# Patient Record
Sex: FEMALE | Race: OTHER
[De-identification: names, ages, dates, MRNs, and addresses within clinical notes are randomized per-mention and may not be internally consistent; named-entity substitution may affect disease eponyms.]

---

## 2022-12-20 ENCOUNTER — HOSPITAL ENCOUNTER (INPATIENT)
Dept: HOSPITAL 15 - ER | Age: 72
LOS: 5 days | Discharge: HOME | DRG: 282 | End: 2022-12-25
Attending: FAMILY MEDICINE
Payer: MEDICARE

## 2022-12-20 VITALS — WEIGHT: 111.99 LBS | HEIGHT: 59 IN | BODY MASS INDEX: 22.58 KG/M2

## 2022-12-20 DIAGNOSIS — F12.10: ICD-10-CM

## 2022-12-20 DIAGNOSIS — R07.89: ICD-10-CM

## 2022-12-20 DIAGNOSIS — Z90.49: ICD-10-CM

## 2022-12-20 DIAGNOSIS — I10: ICD-10-CM

## 2022-12-20 DIAGNOSIS — Z79.899: ICD-10-CM

## 2022-12-20 DIAGNOSIS — K85.20: Primary | ICD-10-CM

## 2022-12-20 DIAGNOSIS — E78.00: ICD-10-CM

## 2022-12-20 DIAGNOSIS — F10.10: ICD-10-CM

## 2022-12-20 DIAGNOSIS — Z71.41: ICD-10-CM

## 2022-12-20 DIAGNOSIS — Z90.710: ICD-10-CM

## 2022-12-20 DIAGNOSIS — D72.829: ICD-10-CM

## 2022-12-20 DIAGNOSIS — R74.01: ICD-10-CM

## 2022-12-20 DIAGNOSIS — Z80.6: ICD-10-CM

## 2022-12-20 DIAGNOSIS — E78.5: ICD-10-CM

## 2022-12-20 DIAGNOSIS — Z20.822: ICD-10-CM

## 2022-12-20 LAB
ALBUMIN SERPL-MCNC: 3.9 G/DL (ref 3.4–5)
ALCOHOL, URINE: < 3 MG/DL (ref 0–10)
ALP SERPL-CCNC: 157 U/L (ref 45–117)
ALT SERPL-CCNC: 19 U/L (ref 13–56)
AMPHETAMINES UR QL SCN: NEGATIVE
ANION GAP SERPL CALCULATED.3IONS-SCNC: 12 MMOL/L (ref 5–15)
BARBITURATES UR QL SCN: NEGATIVE
BENZODIAZ UR QL SCN: NEGATIVE
BILIRUB SERPL-MCNC: 0.9 MG/DL (ref 0.2–1)
BUN SERPL-MCNC: 15 MG/DL (ref 7–18)
BUN/CREAT SERPL: 16.7
BZE UR QL SCN: NEGATIVE
CALCIUM SERPL-MCNC: 10.1 MG/DL (ref 8.5–10.1)
CANNABINOIDS UR QL SCN: POSITIVE
CHLORIDE SERPL-SCNC: 102 MMOL/L (ref 98–107)
CHOLEST SERPL-MCNC: 201 MG/DL (ref ?–200)
CO2 SERPL-SCNC: 25 MMOL/L (ref 21–32)
GLUCOSE SERPL-MCNC: 121 MG/DL (ref 74–106)
HCT VFR BLD AUTO: 44.6 % (ref 36–46)
HDLC SERPL-MCNC: 88 MG/DL (ref 40–59)
HGB BLD-MCNC: 15.3 G/DL (ref 12.2–16.2)
MCH RBC QN AUTO: 33.6 PG (ref 28–32)
MCV RBC AUTO: 97.8 FL (ref 80–100)
NRBC BLD QL AUTO: 0.3 %
OPIATES UR QL SCN: NEGATIVE
PCP UR QL SCN: NEGATIVE
POTASSIUM SERPL-SCNC: 3.6 MMOL/L (ref 3.5–5.1)
PROT SERPL-MCNC: 7.6 G/DL (ref 6.4–8.2)
SODIUM SERPL-SCNC: 139 MMOL/L (ref 136–145)
TRIGL SERPL-MCNC: 97 MG/DL (ref ?–150)

## 2022-12-20 PROCEDURE — 80320 DRUG SCREEN QUANTALCOHOLS: CPT

## 2022-12-20 PROCEDURE — 80053 COMPREHEN METABOLIC PANEL: CPT

## 2022-12-20 PROCEDURE — 93306 TTE W/DOPPLER COMPLETE: CPT

## 2022-12-20 PROCEDURE — 93017 CV STRESS TEST TRACING ONLY: CPT

## 2022-12-20 PROCEDURE — 74183 MRI ABD W/O CNTR FLWD CNTR: CPT

## 2022-12-20 PROCEDURE — 74176 CT ABD & PELVIS W/O CONTRAST: CPT

## 2022-12-20 PROCEDURE — 86301 IMMUNOASSAY TUMOR CA 19-9: CPT

## 2022-12-20 PROCEDURE — 85025 COMPLETE CBC W/AUTO DIFF WBC: CPT

## 2022-12-20 PROCEDURE — 80307 DRUG TEST PRSMV CHEM ANLYZR: CPT

## 2022-12-20 PROCEDURE — 83605 ASSAY OF LACTIC ACID: CPT

## 2022-12-20 PROCEDURE — 71045 X-RAY EXAM CHEST 1 VIEW: CPT

## 2022-12-20 PROCEDURE — 87426 SARSCOV CORONAVIRUS AG IA: CPT

## 2022-12-20 PROCEDURE — 87040 BLOOD CULTURE FOR BACTERIA: CPT

## 2022-12-20 PROCEDURE — 74181 MRI ABDOMEN W/O CONTRAST: CPT

## 2022-12-20 PROCEDURE — 78452 HT MUSCLE IMAGE SPECT MULT: CPT

## 2022-12-20 PROCEDURE — 36415 COLL VENOUS BLD VENIPUNCTURE: CPT

## 2022-12-20 PROCEDURE — 84443 ASSAY THYROID STIM HORMONE: CPT

## 2022-12-20 PROCEDURE — 84484 ASSAY OF TROPONIN QUANT: CPT

## 2022-12-20 PROCEDURE — 93005 ELECTROCARDIOGRAM TRACING: CPT

## 2022-12-20 PROCEDURE — 80061 LIPID PANEL: CPT

## 2022-12-20 PROCEDURE — 81003 URINALYSIS AUTO W/O SCOPE: CPT

## 2022-12-20 PROCEDURE — 83690 ASSAY OF LIPASE: CPT

## 2022-12-20 RX ADMIN — OXYCODONE HYDROCHLORIDE AND ACETAMINOPHEN SCH MG: 500 TABLET ORAL at 20:47

## 2022-12-20 RX ADMIN — SODIUM CHLORIDE SCH MLS/HR: 0.9 INJECTION, SOLUTION INTRAVENOUS at 21:01

## 2022-12-21 VITALS — DIASTOLIC BLOOD PRESSURE: 107 MMHG | SYSTOLIC BLOOD PRESSURE: 136 MMHG

## 2022-12-21 LAB
ALBUMIN SERPL-MCNC: 3.7 G/DL (ref 3.4–5)
ALP SERPL-CCNC: 136 U/L (ref 45–117)
ALT SERPL-CCNC: 18 U/L (ref 13–56)
ANION GAP SERPL CALCULATED.3IONS-SCNC: 10 MMOL/L (ref 5–15)
BILIRUB SERPL-MCNC: 1 MG/DL (ref 0.2–1)
BUN SERPL-MCNC: 21 MG/DL (ref 7–18)
BUN/CREAT SERPL: 25
CALCIUM SERPL-MCNC: 9.1 MG/DL (ref 8.5–10.1)
CHLORIDE SERPL-SCNC: 105 MMOL/L (ref 98–107)
CO2 SERPL-SCNC: 23 MMOL/L (ref 21–32)
GLUCOSE SERPL-MCNC: 118 MG/DL (ref 74–106)
HCT VFR BLD AUTO: 45.2 % (ref 36–46)
HGB BLD-MCNC: 15 G/DL (ref 12.2–16.2)
MCH RBC QN AUTO: 33 PG (ref 28–32)
MCV RBC AUTO: 99.2 FL (ref 80–100)
NRBC BLD QL AUTO: 0.2 %
POTASSIUM SERPL-SCNC: 3.4 MMOL/L (ref 3.5–5.1)
PROT SERPL-MCNC: 7.1 G/DL (ref 6.4–8.2)
SODIUM SERPL-SCNC: 138 MMOL/L (ref 136–145)

## 2022-12-21 RX ADMIN — MORPHINE SULFATE PRN MG: 2 INJECTION, SOLUTION INTRAMUSCULAR; INTRAVENOUS at 20:53

## 2022-12-21 RX ADMIN — OXYCODONE HYDROCHLORIDE AND ACETAMINOPHEN SCH MG: 500 TABLET ORAL at 11:18

## 2022-12-21 RX ADMIN — ATORVASTATIN CALCIUM SCH MG: 20 TABLET, FILM COATED ORAL at 22:44

## 2022-12-21 RX ADMIN — NITROGLYCERIN PRN MG: 0.4 TABLET SUBLINGUAL at 22:49

## 2022-12-21 RX ADMIN — NITROGLYCERIN PRN MG: 0.4 TABLET SUBLINGUAL at 23:03

## 2022-12-21 RX ADMIN — SODIUM CHLORIDE SCH MG: 9 INJECTION, SOLUTION INTRAVENOUS at 11:17

## 2022-12-21 RX ADMIN — ZINC SULFATE CAP 220 MG (50 MG ELEMENTAL ZN) SCH MG: 220 (50 ZN) CAP at 11:18

## 2022-12-21 RX ADMIN — ACETAMINOPHEN PRN MG: 325 TABLET ORAL at 12:28

## 2022-12-21 RX ADMIN — CEFTRIAXONE SODIUM SCH MLS/HR: 1 INJECTION, POWDER, FOR SOLUTION INTRAMUSCULAR; INTRAVENOUS at 08:55

## 2022-12-21 RX ADMIN — MORPHINE SULFATE PRN MG: 2 INJECTION, SOLUTION INTRAMUSCULAR; INTRAVENOUS at 23:19

## 2022-12-21 RX ADMIN — OXYCODONE HYDROCHLORIDE AND ACETAMINOPHEN SCH MG: 500 TABLET ORAL at 22:44

## 2022-12-21 RX ADMIN — MULTIVITAMIN TABLET SCH TAB: TABLET at 11:18

## 2022-12-21 RX ADMIN — SODIUM CHLORIDE SCH MLS/HR: 0.9 INJECTION, SOLUTION INTRAVENOUS at 12:06

## 2022-12-21 RX ADMIN — ENOXAPARIN SODIUM SCH MG: 30 INJECTION SUBCUTANEOUS at 11:18

## 2022-12-21 RX ADMIN — NITROGLYCERIN PRN MG: 0.4 TABLET SUBLINGUAL at 22:56

## 2022-12-21 RX ADMIN — ACETAMINOPHEN PRN MG: 325 TABLET ORAL at 06:19

## 2022-12-22 VITALS — DIASTOLIC BLOOD PRESSURE: 86 MMHG | SYSTOLIC BLOOD PRESSURE: 140 MMHG

## 2022-12-22 VITALS — DIASTOLIC BLOOD PRESSURE: 94 MMHG | SYSTOLIC BLOOD PRESSURE: 144 MMHG

## 2022-12-22 VITALS — SYSTOLIC BLOOD PRESSURE: 131 MMHG | DIASTOLIC BLOOD PRESSURE: 88 MMHG

## 2022-12-22 VITALS — DIASTOLIC BLOOD PRESSURE: 94 MMHG | SYSTOLIC BLOOD PRESSURE: 149 MMHG

## 2022-12-22 VITALS — DIASTOLIC BLOOD PRESSURE: 79 MMHG | SYSTOLIC BLOOD PRESSURE: 122 MMHG

## 2022-12-22 LAB
ALBUMIN SERPL-MCNC: 2.8 G/DL (ref 3.4–5)
ALP SERPL-CCNC: 105 U/L (ref 45–117)
ALT SERPL-CCNC: 17 U/L (ref 13–56)
ANION GAP SERPL CALCULATED.3IONS-SCNC: 6 MMOL/L (ref 5–15)
BILIRUB SERPL-MCNC: 0.8 MG/DL (ref 0.2–1)
BUN SERPL-MCNC: 15 MG/DL (ref 7–18)
BUN/CREAT SERPL: 21.1
CALCIUM SERPL-MCNC: 8.1 MG/DL (ref 8.5–10.1)
CHLORIDE SERPL-SCNC: 110 MMOL/L (ref 98–107)
CO2 SERPL-SCNC: 23 MMOL/L (ref 21–32)
GLUCOSE SERPL-MCNC: 76 MG/DL (ref 74–106)
LIPASE SERPL-CCNC: 418 U/L (ref 73–393)
POTASSIUM SERPL-SCNC: 3.3 MMOL/L (ref 3.5–5.1)
PROT SERPL-MCNC: 5.9 G/DL (ref 6.4–8.2)
SODIUM SERPL-SCNC: 139 MMOL/L (ref 136–145)

## 2022-12-22 RX ADMIN — MORPHINE SULFATE PRN MG: 2 INJECTION, SOLUTION INTRAMUSCULAR; INTRAVENOUS at 22:15

## 2022-12-22 RX ADMIN — CEFTRIAXONE SODIUM SCH MLS/HR: 1 INJECTION, POWDER, FOR SOLUTION INTRAMUSCULAR; INTRAVENOUS at 10:05

## 2022-12-22 RX ADMIN — ENOXAPARIN SODIUM SCH MG: 30 INJECTION SUBCUTANEOUS at 10:06

## 2022-12-22 RX ADMIN — OXYCODONE HYDROCHLORIDE AND ACETAMINOPHEN SCH MG: 500 TABLET ORAL at 22:22

## 2022-12-22 RX ADMIN — MORPHINE SULFATE PRN MG: 2 INJECTION, SOLUTION INTRAMUSCULAR; INTRAVENOUS at 13:31

## 2022-12-22 RX ADMIN — OXYCODONE HYDROCHLORIDE AND ACETAMINOPHEN SCH MG: 500 TABLET ORAL at 10:08

## 2022-12-22 RX ADMIN — ZINC SULFATE CAP 220 MG (50 MG ELEMENTAL ZN) SCH MG: 220 (50 ZN) CAP at 10:08

## 2022-12-22 RX ADMIN — MULTIVITAMIN TABLET SCH TAB: TABLET at 10:07

## 2022-12-22 RX ADMIN — ATORVASTATIN CALCIUM SCH MG: 20 TABLET, FILM COATED ORAL at 22:22

## 2022-12-22 RX ADMIN — SODIUM CHLORIDE SCH MG: 9 INJECTION, SOLUTION INTRAVENOUS at 10:10

## 2022-12-23 VITALS — SYSTOLIC BLOOD PRESSURE: 131 MMHG | DIASTOLIC BLOOD PRESSURE: 92 MMHG

## 2022-12-23 VITALS — DIASTOLIC BLOOD PRESSURE: 94 MMHG | SYSTOLIC BLOOD PRESSURE: 140 MMHG

## 2022-12-23 VITALS — SYSTOLIC BLOOD PRESSURE: 131 MMHG | DIASTOLIC BLOOD PRESSURE: 102 MMHG

## 2022-12-23 VITALS — DIASTOLIC BLOOD PRESSURE: 87 MMHG | SYSTOLIC BLOOD PRESSURE: 127 MMHG

## 2022-12-23 VITALS — SYSTOLIC BLOOD PRESSURE: 144 MMHG | DIASTOLIC BLOOD PRESSURE: 87 MMHG

## 2022-12-23 RX ADMIN — ENOXAPARIN SODIUM SCH MG: 30 INJECTION SUBCUTANEOUS at 09:23

## 2022-12-23 RX ADMIN — MULTIVITAMIN TABLET SCH TAB: TABLET at 09:22

## 2022-12-23 RX ADMIN — OXYCODONE HYDROCHLORIDE AND ACETAMINOPHEN SCH MG: 500 TABLET ORAL at 09:22

## 2022-12-23 RX ADMIN — MORPHINE SULFATE PRN MG: 2 INJECTION, SOLUTION INTRAMUSCULAR; INTRAVENOUS at 09:33

## 2022-12-23 RX ADMIN — ATORVASTATIN CALCIUM SCH MG: 20 TABLET, FILM COATED ORAL at 21:55

## 2022-12-23 RX ADMIN — CEFTRIAXONE SODIUM SCH MLS/HR: 1 INJECTION, POWDER, FOR SOLUTION INTRAMUSCULAR; INTRAVENOUS at 09:16

## 2022-12-23 RX ADMIN — MORPHINE SULFATE PRN MG: 2 INJECTION, SOLUTION INTRAMUSCULAR; INTRAVENOUS at 15:58

## 2022-12-23 RX ADMIN — MORPHINE SULFATE PRN MG: 2 INJECTION, SOLUTION INTRAMUSCULAR; INTRAVENOUS at 21:56

## 2022-12-23 RX ADMIN — ZINC SULFATE CAP 220 MG (50 MG ELEMENTAL ZN) SCH MG: 220 (50 ZN) CAP at 09:21

## 2022-12-23 RX ADMIN — ACETAMINOPHEN PRN MG: 325 TABLET ORAL at 03:00

## 2022-12-23 RX ADMIN — OXYCODONE HYDROCHLORIDE AND ACETAMINOPHEN SCH MG: 500 TABLET ORAL at 21:55

## 2022-12-23 RX ADMIN — SODIUM CHLORIDE SCH MG: 9 INJECTION, SOLUTION INTRAVENOUS at 09:18

## 2022-12-23 RX ADMIN — MORPHINE SULFATE PRN MG: 2 INJECTION, SOLUTION INTRAMUSCULAR; INTRAVENOUS at 03:53

## 2022-12-24 VITALS — DIASTOLIC BLOOD PRESSURE: 89 MMHG | SYSTOLIC BLOOD PRESSURE: 126 MMHG

## 2022-12-24 VITALS — DIASTOLIC BLOOD PRESSURE: 78 MMHG | SYSTOLIC BLOOD PRESSURE: 117 MMHG

## 2022-12-24 VITALS — DIASTOLIC BLOOD PRESSURE: 98 MMHG | SYSTOLIC BLOOD PRESSURE: 139 MMHG

## 2022-12-24 VITALS — DIASTOLIC BLOOD PRESSURE: 92 MMHG | SYSTOLIC BLOOD PRESSURE: 134 MMHG

## 2022-12-24 VITALS — DIASTOLIC BLOOD PRESSURE: 96 MMHG | SYSTOLIC BLOOD PRESSURE: 141 MMHG

## 2022-12-24 RX ADMIN — ZINC SULFATE CAP 220 MG (50 MG ELEMENTAL ZN) SCH MG: 220 (50 ZN) CAP at 09:27

## 2022-12-24 RX ADMIN — ACETAMINOPHEN PRN MG: 325 TABLET ORAL at 04:53

## 2022-12-24 RX ADMIN — OXYCODONE HYDROCHLORIDE AND ACETAMINOPHEN SCH MG: 500 TABLET ORAL at 09:26

## 2022-12-24 RX ADMIN — SODIUM CHLORIDE SCH MG: 9 INJECTION, SOLUTION INTRAVENOUS at 09:27

## 2022-12-24 RX ADMIN — ENOXAPARIN SODIUM SCH MG: 30 INJECTION SUBCUTANEOUS at 09:28

## 2022-12-24 RX ADMIN — ATORVASTATIN CALCIUM SCH MG: 20 TABLET, FILM COATED ORAL at 20:55

## 2022-12-24 RX ADMIN — MORPHINE SULFATE PRN MG: 2 INJECTION, SOLUTION INTRAMUSCULAR; INTRAVENOUS at 08:28

## 2022-12-24 RX ADMIN — MORPHINE SULFATE PRN MG: 2 INJECTION, SOLUTION INTRAMUSCULAR; INTRAVENOUS at 13:24

## 2022-12-24 RX ADMIN — OXYCODONE HYDROCHLORIDE AND ACETAMINOPHEN SCH MG: 500 TABLET ORAL at 20:55

## 2022-12-24 RX ADMIN — MORPHINE SULFATE PRN MG: 2 INJECTION, SOLUTION INTRAMUSCULAR; INTRAVENOUS at 09:16

## 2022-12-24 RX ADMIN — MULTIVITAMIN TABLET SCH TAB: TABLET at 09:26

## 2022-12-24 RX ADMIN — CEFTRIAXONE SODIUM SCH MLS/HR: 1 INJECTION, POWDER, FOR SOLUTION INTRAMUSCULAR; INTRAVENOUS at 09:28

## 2022-12-24 RX ADMIN — MORPHINE SULFATE PRN MG: 2 INJECTION, SOLUTION INTRAMUSCULAR; INTRAVENOUS at 18:39

## 2022-12-24 RX ADMIN — MORPHINE SULFATE PRN MG: 2 INJECTION, SOLUTION INTRAMUSCULAR; INTRAVENOUS at 22:28

## 2022-12-25 VITALS — DIASTOLIC BLOOD PRESSURE: 107 MMHG | SYSTOLIC BLOOD PRESSURE: 145 MMHG

## 2022-12-25 VITALS — DIASTOLIC BLOOD PRESSURE: 63 MMHG | SYSTOLIC BLOOD PRESSURE: 128 MMHG

## 2022-12-25 VITALS — SYSTOLIC BLOOD PRESSURE: 134 MMHG | DIASTOLIC BLOOD PRESSURE: 81 MMHG

## 2022-12-25 VITALS — SYSTOLIC BLOOD PRESSURE: 134 MMHG | DIASTOLIC BLOOD PRESSURE: 93 MMHG

## 2022-12-25 RX ADMIN — CEFTRIAXONE SODIUM SCH MLS/HR: 1 INJECTION, POWDER, FOR SOLUTION INTRAMUSCULAR; INTRAVENOUS at 08:39

## 2022-12-25 RX ADMIN — ENOXAPARIN SODIUM SCH MG: 30 INJECTION SUBCUTANEOUS at 08:39

## 2022-12-25 RX ADMIN — MORPHINE SULFATE PRN MG: 2 INJECTION, SOLUTION INTRAMUSCULAR; INTRAVENOUS at 05:29

## 2022-12-25 RX ADMIN — ZINC SULFATE CAP 220 MG (50 MG ELEMENTAL ZN) SCH MG: 220 (50 ZN) CAP at 08:37

## 2022-12-25 RX ADMIN — OXYCODONE HYDROCHLORIDE AND ACETAMINOPHEN SCH MG: 500 TABLET ORAL at 08:39

## 2022-12-25 RX ADMIN — SODIUM CHLORIDE SCH MG: 9 INJECTION, SOLUTION INTRAVENOUS at 08:38

## 2022-12-25 RX ADMIN — MULTIVITAMIN TABLET SCH TAB: TABLET at 08:43

## 2022-12-25 RX ADMIN — MORPHINE SULFATE PRN MG: 2 INJECTION, SOLUTION INTRAMUSCULAR; INTRAVENOUS at 09:53

## 2023-06-16 LAB
ALBUMIN SERPL-MCNC: 3.2 G/DL (ref 3.4–5)
ALP SERPL-CCNC: 114 U/L (ref 45–117)
ALT SERPL-CCNC: 17 U/L (ref 13–56)
ANION GAP SERPL CALCULATED.3IONS-SCNC: 5 MMOL/L (ref 5–15)
APTT PPP: 26.7 SEC (ref 24.6–33.4)
BILIRUB SERPL-MCNC: 0.3 MG/DL (ref 0.2–1)
BUN SERPL-MCNC: 15 MG/DL (ref 7–18)
BUN/CREAT SERPL: 16 (ref 10–20)
CALCIUM SERPL-MCNC: 8.3 MG/DL (ref 8.5–10.1)
CHLORIDE SERPL-SCNC: 109 MMOL/L (ref 98–107)
CO2 SERPL-SCNC: 26 MMOL/L (ref 21–32)
GLUCOSE SERPL-MCNC: 87 MG/DL (ref 74–106)
HCT VFR BLD AUTO: 43 % (ref 36–46)
HGB BLD-MCNC: 14.4 G/DL (ref 12.2–16.2)
INR PPP: 0.91 (ref 0.9–1.15)
MCH RBC QN AUTO: 34.1 PG (ref 28–32)
MCV RBC AUTO: 102 FL (ref 80–100)
NRBC BLD QL AUTO: 0.1 %
POTASSIUM SERPL-SCNC: 4.1 MMOL/L (ref 3.5–5.1)
PROT SERPL-MCNC: 6.9 G/DL (ref 6.4–8.2)
SODIUM SERPL-SCNC: 140 MMOL/L (ref 136–145)

## 2023-06-23 ENCOUNTER — HOSPITAL ENCOUNTER (OUTPATIENT)
Dept: HOSPITAL 15 - GI | Age: 73
Discharge: HOME | End: 2023-06-23
Attending: INTERNAL MEDICINE
Payer: MEDICARE

## 2023-06-23 VITALS — BODY MASS INDEX: 19.56 KG/M2 | HEIGHT: 59 IN | WEIGHT: 97 LBS

## 2023-06-23 VITALS — SYSTOLIC BLOOD PRESSURE: 134 MMHG | DIASTOLIC BLOOD PRESSURE: 89 MMHG

## 2023-06-23 DIAGNOSIS — Z12.11: Primary | ICD-10-CM

## 2023-06-23 DIAGNOSIS — Z98.890: ICD-10-CM

## 2023-06-23 DIAGNOSIS — D12.3: ICD-10-CM

## 2023-06-23 DIAGNOSIS — D12.2: ICD-10-CM

## 2023-06-23 DIAGNOSIS — Z83.79: ICD-10-CM

## 2023-06-23 DIAGNOSIS — Z79.899: ICD-10-CM

## 2023-06-23 DIAGNOSIS — D12.4: ICD-10-CM

## 2023-06-23 DIAGNOSIS — Z80.6: ICD-10-CM

## 2023-06-23 DIAGNOSIS — D12.0: ICD-10-CM

## 2023-06-23 DIAGNOSIS — I10: ICD-10-CM

## 2023-06-23 DIAGNOSIS — Z90.710: ICD-10-CM

## 2023-06-23 PROCEDURE — 36415 COLL VENOUS BLD VENIPUNCTURE: CPT

## 2023-06-23 PROCEDURE — 80053 COMPREHEN METABOLIC PANEL: CPT

## 2023-06-23 PROCEDURE — 99152 MOD SED SAME PHYS/QHP 5/>YRS: CPT

## 2023-06-23 PROCEDURE — 85025 COMPLETE CBC W/AUTO DIFF WBC: CPT

## 2023-06-23 PROCEDURE — 99153 MOD SED SAME PHYS/QHP EA: CPT

## 2023-06-23 PROCEDURE — 85730 THROMBOPLASTIN TIME PARTIAL: CPT

## 2023-06-23 PROCEDURE — 88305 TISSUE EXAM BY PATHOLOGIST: CPT

## 2023-06-23 PROCEDURE — 85610 PROTHROMBIN TIME: CPT

## 2023-06-23 PROCEDURE — 45385 COLONOSCOPY W/LESION REMOVAL: CPT

## 2023-06-23 RX ADMIN — MIDAZOLAM HYDROCHLORIDE ONE MG: 1 INJECTION, SOLUTION INTRAMUSCULAR; INTRAVENOUS at 14:39

## 2023-06-23 RX ADMIN — FENTANYL CITRATE ONE MCG: 50 INJECTION, SOLUTION INTRAMUSCULAR; INTRAVENOUS at 14:29

## 2023-06-23 RX ADMIN — FENTANYL CITRATE ONE MCG: 50 INJECTION, SOLUTION INTRAMUSCULAR; INTRAVENOUS at 14:39

## 2023-06-23 RX ADMIN — FENTANYL CITRATE ONE MCG: 50 INJECTION, SOLUTION INTRAMUSCULAR; INTRAVENOUS at 14:36

## 2023-06-23 RX ADMIN — FENTANYL CITRATE ONE MCG: 50 INJECTION, SOLUTION INTRAMUSCULAR; INTRAVENOUS at 14:33

## 2023-06-23 RX ADMIN — MIDAZOLAM HYDROCHLORIDE ONE MG: 1 INJECTION, SOLUTION INTRAMUSCULAR; INTRAVENOUS at 14:36

## 2023-06-23 RX ADMIN — MIDAZOLAM HYDROCHLORIDE ONE MG: 1 INJECTION, SOLUTION INTRAMUSCULAR; INTRAVENOUS at 14:33

## 2023-06-23 RX ADMIN — MIDAZOLAM HYDROCHLORIDE ONE MG: 1 INJECTION, SOLUTION INTRAMUSCULAR; INTRAVENOUS at 14:29

## 2023-06-23 RX ADMIN — MIDAZOLAM HYDROCHLORIDE ONE MG: 1 INJECTION, SOLUTION INTRAMUSCULAR; INTRAVENOUS at 14:59

## 2023-06-23 RX ADMIN — MIDAZOLAM HYDROCHLORIDE ONE MG: 1 INJECTION, SOLUTION INTRAMUSCULAR; INTRAVENOUS at 14:49

## 2024-08-06 ENCOUNTER — HOSPITAL ENCOUNTER (INPATIENT)
Dept: HOSPITAL 15 - ER | Age: 74
LOS: 3 days | Discharge: HOME | DRG: 720 | End: 2024-08-09
Attending: INTERNAL MEDICINE | Admitting: INTERNAL MEDICINE
Payer: MEDICARE

## 2024-08-06 VITALS — OXYGEN SATURATION: 99 % | RESPIRATION RATE: 14 BRPM | HEART RATE: 81 BPM

## 2024-08-06 VITALS — BODY MASS INDEX: 19.07 KG/M2 | HEIGHT: 59 IN | WEIGHT: 94.58 LBS

## 2024-08-06 VITALS
DIASTOLIC BLOOD PRESSURE: 67 MMHG | OXYGEN SATURATION: 98 % | TEMPERATURE: 97.6 F | HEART RATE: 88 BPM | RESPIRATION RATE: 14 BRPM | SYSTOLIC BLOOD PRESSURE: 96 MMHG

## 2024-08-06 DIAGNOSIS — Z90.49: ICD-10-CM

## 2024-08-06 DIAGNOSIS — N39.0: ICD-10-CM

## 2024-08-06 DIAGNOSIS — K86.89: ICD-10-CM

## 2024-08-06 DIAGNOSIS — E87.6: ICD-10-CM

## 2024-08-06 DIAGNOSIS — E87.20: ICD-10-CM

## 2024-08-06 DIAGNOSIS — A41.9: Primary | ICD-10-CM

## 2024-08-06 DIAGNOSIS — K57.30: ICD-10-CM

## 2024-08-06 DIAGNOSIS — K52.9: ICD-10-CM

## 2024-08-06 DIAGNOSIS — Z90.710: ICD-10-CM

## 2024-08-06 DIAGNOSIS — E83.51: ICD-10-CM

## 2024-08-06 DIAGNOSIS — G90.9: ICD-10-CM

## 2024-08-06 DIAGNOSIS — I10: ICD-10-CM

## 2024-08-06 DIAGNOSIS — D64.9: ICD-10-CM

## 2024-08-06 LAB
ALBUMIN SERPL-MCNC: 3.9 G/DL (ref 3.2–4.8)
ALP SERPL-CCNC: 112 U/L (ref 46–116)
ALT SERPL-CCNC: 12 U/L (ref 7–40)
ANION GAP SERPL CALCULATED.3IONS-SCNC: 7 MMOL/L (ref 5–15)
BILIRUB SERPL-MCNC: 0.6 MG/DL (ref 0.2–1)
BUN SERPL-MCNC: 23 MG/DL (ref 9–23)
BUN/CREAT SERPL: 16.8 (ref 10–20)
CALCIUM SERPL-MCNC: 9 MG/DL (ref 8.7–10.4)
CHLORIDE SERPL-SCNC: 112 MMOL/L (ref 98–107)
CO2 SERPL-SCNC: 15 MMOL/L (ref 20–30)
CRYSTALS UR QL AUTO: (no result) /HPF
GLUCOSE SERPL-MCNC: 127 MG/DL (ref 74–106)
HCT VFR BLD AUTO: 39.1 % (ref 36–46)
HGB BLD-MCNC: 13.4 G/DL (ref 12.2–16.2)
LIPASE SERPL-CCNC: 40 U/L (ref 12–53)
MCH RBC QN AUTO: 36.7 PG (ref 28–32)
MCV RBC AUTO: 107 FL (ref 80–100)
NRBC BLD QL AUTO: 0 %
POTASSIUM SERPL-SCNC: 3.8 MMOL/L (ref 3.5–5.1)
PROT SERPL-MCNC: 6.7 G/DL (ref 5.7–8.2)
PROT UR STRIP.AUTO-MCNC: (no result) MG/DL
SODIUM SERPL-SCNC: 134 MMOL/L (ref 136–145)

## 2024-08-06 PROCEDURE — 85025 COMPLETE CBC W/AUTO DIFF WBC: CPT

## 2024-08-06 PROCEDURE — 83735 ASSAY OF MAGNESIUM: CPT

## 2024-08-06 PROCEDURE — 74177 CT ABD & PELVIS W/CONTRAST: CPT

## 2024-08-06 PROCEDURE — 80053 COMPREHEN METABOLIC PANEL: CPT

## 2024-08-06 PROCEDURE — 87040 BLOOD CULTURE FOR BACTERIA: CPT

## 2024-08-06 PROCEDURE — 81001 URINALYSIS AUTO W/SCOPE: CPT

## 2024-08-06 PROCEDURE — 93005 ELECTROCARDIOGRAM TRACING: CPT

## 2024-08-06 PROCEDURE — 70450 CT HEAD/BRAIN W/O DYE: CPT

## 2024-08-06 PROCEDURE — 82805 BLOOD GASES W/O2 SATURATION: CPT

## 2024-08-06 PROCEDURE — 82607 VITAMIN B-12: CPT

## 2024-08-06 PROCEDURE — 82962 GLUCOSE BLOOD TEST: CPT

## 2024-08-06 PROCEDURE — 87086 URINE CULTURE/COLONY COUNT: CPT

## 2024-08-06 PROCEDURE — 83690 ASSAY OF LIPASE: CPT

## 2024-08-06 PROCEDURE — 36415 COLL VENOUS BLD VENIPUNCTURE: CPT

## 2024-08-06 PROCEDURE — 96361 HYDRATE IV INFUSION ADD-ON: CPT

## 2024-08-06 PROCEDURE — 82746 ASSAY OF FOLIC ACID SERUM: CPT

## 2024-08-06 PROCEDURE — 96365 THER/PROPH/DIAG IV INF INIT: CPT

## 2024-08-06 PROCEDURE — 83605 ASSAY OF LACTIC ACID: CPT

## 2024-08-06 PROCEDURE — 87493 C DIFF AMPLIFIED PROBE: CPT

## 2024-08-06 PROCEDURE — 71260 CT THORAX DX C+: CPT

## 2024-08-06 PROCEDURE — 74176 CT ABD & PELVIS W/O CONTRAST: CPT

## 2024-08-06 PROCEDURE — 36600 WITHDRAWAL OF ARTERIAL BLOOD: CPT

## 2024-08-06 PROCEDURE — 84132 ASSAY OF SERUM POTASSIUM: CPT

## 2024-08-06 RX ADMIN — CEFTRIAXONE SODIUM ONE MLS/HR: 1 INJECTION, POWDER, FOR SOLUTION INTRAMUSCULAR; INTRAVENOUS at 19:02

## 2024-08-06 RX ADMIN — SODIUM CHLORIDE ONE MLS/HR: 0.9 INJECTION, SOLUTION INTRAVENOUS at 13:22

## 2024-08-07 VITALS
TEMPERATURE: 97.5 F | RESPIRATION RATE: 18 BRPM | OXYGEN SATURATION: 98 % | HEART RATE: 81 BPM | SYSTOLIC BLOOD PRESSURE: 101 MMHG | DIASTOLIC BLOOD PRESSURE: 70 MMHG

## 2024-08-07 VITALS
RESPIRATION RATE: 17 BRPM | HEART RATE: 87 BPM | SYSTOLIC BLOOD PRESSURE: 83 MMHG | OXYGEN SATURATION: 98 % | TEMPERATURE: 98.2 F | DIASTOLIC BLOOD PRESSURE: 60 MMHG

## 2024-08-07 VITALS
TEMPERATURE: 98.7 F | DIASTOLIC BLOOD PRESSURE: 57 MMHG | OXYGEN SATURATION: 99 % | SYSTOLIC BLOOD PRESSURE: 83 MMHG | RESPIRATION RATE: 18 BRPM | HEART RATE: 80 BPM

## 2024-08-07 VITALS
HEART RATE: 87 BPM | SYSTOLIC BLOOD PRESSURE: 105 MMHG | TEMPERATURE: 97.6 F | RESPIRATION RATE: 16 BRPM | DIASTOLIC BLOOD PRESSURE: 71 MMHG | OXYGEN SATURATION: 96 %

## 2024-08-07 VITALS — HEART RATE: 92 BPM

## 2024-08-07 VITALS
TEMPERATURE: 97.6 F | HEART RATE: 71 BPM | SYSTOLIC BLOOD PRESSURE: 86 MMHG | RESPIRATION RATE: 16 BRPM | OXYGEN SATURATION: 96 % | DIASTOLIC BLOOD PRESSURE: 68 MMHG

## 2024-08-07 VITALS — RESPIRATION RATE: 16 BRPM | OXYGEN SATURATION: 96 % | HEART RATE: 71 BPM

## 2024-08-07 VITALS — RESPIRATION RATE: 16 BRPM | OXYGEN SATURATION: 95 % | HEART RATE: 70 BPM

## 2024-08-07 LAB
ALBUMIN SERPL-MCNC: 3 G/DL (ref 3.2–4.8)
ALP SERPL-CCNC: 86 U/L (ref 46–116)
ALT SERPL-CCNC: < 9 U/L (ref 7–40)
ANION GAP SERPL CALCULATED.3IONS-SCNC: 10 MMOL/L (ref 5–15)
BASE EXCESS BLDV CALC-SCNC: -11.8 MMOL/L (ref -2–2)
BILIRUB SERPL-MCNC: 0.4 MG/DL (ref 0.2–1)
BUN SERPL-MCNC: 18 MG/DL (ref 9–23)
BUN/CREAT SERPL: 22.8 (ref 10–20)
CALCIUM SERPL-MCNC: 7.4 MG/DL (ref 8.7–10.4)
CHLORIDE SERPL-SCNC: 116 MMOL/L (ref 98–107)
CO2 SERPL-SCNC: 12 MMOL/L (ref 20–30)
GLUCOSE SERPL-MCNC: 74 MG/DL (ref 74–106)
HCT VFR BLD AUTO: 33.5 % (ref 36–46)
HGB BLD-MCNC: 11.6 G/DL (ref 12.2–16.2)
LACTATE PLASV-SCNC: 2 MMOL/L (ref 0.4–2)
MCH RBC QN AUTO: 36.9 PG (ref 28–32)
MCV RBC AUTO: 106.9 FL (ref 80–100)
NRBC BLD QL AUTO: 0.1 %
POTASSIUM SERPL-SCNC: 2.9 MMOL/L (ref 3.5–5.1)
PROT SERPL-MCNC: 4.8 G/DL (ref 5.7–8.2)
SODIUM SERPL-SCNC: 138 MMOL/L (ref 136–145)

## 2024-08-07 RX ADMIN — ENOXAPARIN SODIUM SCH MG: 30 INJECTION SUBCUTANEOUS at 09:33

## 2024-08-07 RX ADMIN — POTASSIUM CHLORIDE ONE MLS/HR: 200 INJECTION, SOLUTION INTRAVENOUS at 11:45

## 2024-08-07 RX ADMIN — MAGNESIUM SULFATE IN DEXTROSE SCH MLS/HR: 10 INJECTION, SOLUTION INTRAVENOUS at 18:00

## 2024-08-07 RX ADMIN — SODIUM CHLORIDE SCH MLS/HR: 0.9 INJECTION, SOLUTION INTRAVENOUS at 14:45

## 2024-08-07 RX ADMIN — POTASSIUM CHLORIDE ONE MEQ: 1500 TABLET, EXTENDED RELEASE ORAL at 23:10

## 2024-08-07 RX ADMIN — CEFTRIAXONE SODIUM SCH MLS/HR: 1 INJECTION, POWDER, FOR SOLUTION INTRAMUSCULAR; INTRAVENOUS at 09:33

## 2024-08-07 RX ADMIN — CYANOCOBALAMIN ONE MCG: 1000 INJECTION, SOLUTION INTRAMUSCULAR at 23:09

## 2024-08-07 RX ADMIN — PANTOPRAZOLE SODIUM SCH MG: 40 INJECTION, POWDER, FOR SOLUTION INTRAVENOUS at 09:33

## 2024-08-07 RX ADMIN — MECLIZINE HYDROCHLORIDE PRN MG: 25 TABLET ORAL at 07:03

## 2024-08-07 RX ADMIN — SODIUM CHLORIDE ONE MLS/HR: 0.9 INJECTION, SOLUTION INTRAVENOUS at 12:06

## 2024-08-07 RX ADMIN — HUMAN INSULIN SCH MLS/HR: 100 INJECTION, SOLUTION SUBCUTANEOUS at 08:30

## 2024-08-08 VITALS
HEART RATE: 78 BPM | DIASTOLIC BLOOD PRESSURE: 55 MMHG | TEMPERATURE: 97.8 F | SYSTOLIC BLOOD PRESSURE: 82 MMHG | OXYGEN SATURATION: 93 % | RESPIRATION RATE: 15 BRPM

## 2024-08-08 VITALS — OXYGEN SATURATION: 98 % | HEART RATE: 76 BPM | RESPIRATION RATE: 17 BRPM

## 2024-08-08 VITALS
SYSTOLIC BLOOD PRESSURE: 105 MMHG | HEART RATE: 72 BPM | DIASTOLIC BLOOD PRESSURE: 77 MMHG | TEMPERATURE: 97.8 F | RESPIRATION RATE: 14 BRPM | OXYGEN SATURATION: 98 %

## 2024-08-08 VITALS
HEART RATE: 71 BPM | SYSTOLIC BLOOD PRESSURE: 101 MMHG | TEMPERATURE: 97.5 F | RESPIRATION RATE: 20 BRPM | OXYGEN SATURATION: 100 % | DIASTOLIC BLOOD PRESSURE: 66 MMHG

## 2024-08-08 VITALS
HEART RATE: 77 BPM | TEMPERATURE: 97.5 F | SYSTOLIC BLOOD PRESSURE: 111 MMHG | OXYGEN SATURATION: 100 % | RESPIRATION RATE: 18 BRPM | DIASTOLIC BLOOD PRESSURE: 70 MMHG

## 2024-08-08 VITALS — HEART RATE: 70 BPM

## 2024-08-08 VITALS
DIASTOLIC BLOOD PRESSURE: 60 MMHG | OXYGEN SATURATION: 95 % | HEART RATE: 73 BPM | TEMPERATURE: 98 F | RESPIRATION RATE: 17 BRPM | SYSTOLIC BLOOD PRESSURE: 91 MMHG

## 2024-08-08 VITALS
OXYGEN SATURATION: 97 % | DIASTOLIC BLOOD PRESSURE: 73 MMHG | TEMPERATURE: 98.2 F | SYSTOLIC BLOOD PRESSURE: 96 MMHG | RESPIRATION RATE: 18 BRPM | HEART RATE: 75 BPM

## 2024-08-08 VITALS — OXYGEN SATURATION: 96 % | HEART RATE: 70 BPM | RESPIRATION RATE: 16 BRPM

## 2024-08-08 VITALS — DIASTOLIC BLOOD PRESSURE: 65 MMHG | SYSTOLIC BLOOD PRESSURE: 100 MMHG

## 2024-08-08 VITALS
SYSTOLIC BLOOD PRESSURE: 97 MMHG | RESPIRATION RATE: 18 BRPM | TEMPERATURE: 98.3 F | HEART RATE: 78 BPM | DIASTOLIC BLOOD PRESSURE: 53 MMHG | OXYGEN SATURATION: 98 %

## 2024-08-08 LAB
ALBUMIN SERPL-MCNC: 3.2 G/DL (ref 3.2–4.8)
ALP SERPL-CCNC: 88 U/L (ref 46–116)
ALT SERPL-CCNC: 10 U/L (ref 7–40)
ANION GAP SERPL CALCULATED.3IONS-SCNC: 4 MMOL/L (ref 5–15)
BILIRUB SERPL-MCNC: 0.3 MG/DL (ref 0.2–1)
BUN SERPL-MCNC: 11 MG/DL (ref 9–23)
BUN/CREAT SERPL: 12.6 (ref 10–20)
CALCIUM SERPL-MCNC: 8.3 MG/DL (ref 8.7–10.4)
CHLORIDE SERPL-SCNC: 118 MMOL/L (ref 98–107)
CO2 SERPL-SCNC: 14 MMOL/L (ref 20–30)
GLUCOSE SERPL-MCNC: 69 MG/DL (ref 74–106)
HCT VFR BLD AUTO: 33.8 % (ref 36–46)
HGB BLD-MCNC: 11.6 G/DL (ref 12.2–16.2)
MAGNESIUM SERPL-MCNC: 2.3 MG/DL (ref 1.6–2.6)
MCH RBC QN AUTO: 37.4 PG (ref 28–32)
MCV RBC AUTO: 109.1 FL (ref 80–100)
NRBC BLD QL AUTO: 0 %
POTASSIUM SERPL-SCNC: 4.6 MMOL/L (ref 3.5–5.1)
PROT SERPL-MCNC: 5.3 G/DL (ref 5.7–8.2)
SODIUM SERPL-SCNC: 136 MMOL/L (ref 136–145)

## 2024-08-08 RX ADMIN — CYANOCOBALAMIN TAB 500 MCG SCH MCG: 500 TAB at 09:53

## 2024-08-08 RX ADMIN — SODIUM CHLORIDE ONE MLS/HR: 0.9 INJECTION, SOLUTION INTRAVENOUS at 04:56

## 2024-08-09 VITALS
HEART RATE: 72 BPM | RESPIRATION RATE: 17 BRPM | DIASTOLIC BLOOD PRESSURE: 71 MMHG | SYSTOLIC BLOOD PRESSURE: 113 MMHG | TEMPERATURE: 98.1 F

## 2024-08-09 VITALS
TEMPERATURE: 98.1 F | SYSTOLIC BLOOD PRESSURE: 92 MMHG | HEART RATE: 81 BPM | OXYGEN SATURATION: 98 % | DIASTOLIC BLOOD PRESSURE: 68 MMHG

## 2024-08-09 VITALS
DIASTOLIC BLOOD PRESSURE: 74 MMHG | TEMPERATURE: 98.1 F | HEART RATE: 77 BPM | RESPIRATION RATE: 18 BRPM | OXYGEN SATURATION: 93 % | SYSTOLIC BLOOD PRESSURE: 112 MMHG

## 2024-08-09 VITALS
HEART RATE: 76 BPM | OXYGEN SATURATION: 97 % | SYSTOLIC BLOOD PRESSURE: 101 MMHG | DIASTOLIC BLOOD PRESSURE: 71 MMHG | TEMPERATURE: 97.6 F | RESPIRATION RATE: 18 BRPM

## 2024-08-09 VITALS
OXYGEN SATURATION: 94 % | TEMPERATURE: 98.1 F | HEART RATE: 72 BPM | DIASTOLIC BLOOD PRESSURE: 71 MMHG | RESPIRATION RATE: 17 BRPM | SYSTOLIC BLOOD PRESSURE: 113 MMHG

## 2024-08-09 VITALS
OXYGEN SATURATION: 96 % | DIASTOLIC BLOOD PRESSURE: 66 MMHG | HEART RATE: 77 BPM | RESPIRATION RATE: 18 BRPM | TEMPERATURE: 98.4 F | SYSTOLIC BLOOD PRESSURE: 108 MMHG

## 2024-08-09 VITALS — HEART RATE: 84 BPM

## 2024-08-09 LAB
ALBUMIN SERPL-MCNC: 3 G/DL (ref 3.2–4.8)
ALP SERPL-CCNC: 84 U/L (ref 46–116)
ALT SERPL-CCNC: < 9 U/L (ref 7–40)
ANION GAP SERPL CALCULATED.3IONS-SCNC: 7 MMOL/L (ref 5–15)
BILIRUB SERPL-MCNC: 0.4 MG/DL (ref 0.2–1)
BUN SERPL-MCNC: 7 MG/DL (ref 9–23)
BUN/CREAT SERPL: 9.3 (ref 10–20)
CALCIUM SERPL-MCNC: 8.4 MG/DL (ref 8.7–10.4)
CHLORIDE SERPL-SCNC: 118 MMOL/L (ref 98–107)
CO2 SERPL-SCNC: 15 MMOL/L (ref 20–30)
GLUCOSE SERPL-MCNC: 75 MG/DL (ref 74–106)
HCT VFR BLD AUTO: 32.3 % (ref 36–46)
HGB BLD-MCNC: 11.1 G/DL (ref 12.2–16.2)
MCH RBC QN AUTO: 37 PG (ref 28–32)
MCV RBC AUTO: 107.4 FL (ref 80–100)
NRBC BLD QL AUTO: 0.1 %
POTASSIUM SERPL-SCNC: 4.4 MMOL/L (ref 3.5–5.1)
PROT SERPL-MCNC: 4.8 G/DL (ref 5.7–8.2)
SODIUM SERPL-SCNC: 140 MMOL/L (ref 136–145)

## 2024-09-25 ENCOUNTER — HOSPITAL ENCOUNTER (EMERGENCY)
Dept: HOSPITAL 15 - ER | Age: 74
Discharge: HOME | End: 2024-09-25
Payer: MEDICARE

## 2024-09-25 VITALS — HEIGHT: 59 IN | WEIGHT: 93.7 LBS | BODY MASS INDEX: 18.89 KG/M2

## 2024-09-25 VITALS
SYSTOLIC BLOOD PRESSURE: 118 MMHG | HEART RATE: 89 BPM | OXYGEN SATURATION: 98 % | DIASTOLIC BLOOD PRESSURE: 76 MMHG | TEMPERATURE: 98 F | RESPIRATION RATE: 20 BRPM

## 2024-09-25 DIAGNOSIS — S20.212A: Primary | ICD-10-CM

## 2024-09-25 DIAGNOSIS — Z90.710: ICD-10-CM
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
## 2024-09-25 DIAGNOSIS — F12.10: ICD-10-CM

## 2024-09-25 DIAGNOSIS — Z90.49: ICD-10-CM

## 2024-09-25 DIAGNOSIS — I10: ICD-10-CM
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
## 2024-09-25 PROCEDURE — 71101 X-RAY EXAM UNILAT RIBS/CHEST: CPT

## 2025-03-15 ENCOUNTER — HOSPITAL ENCOUNTER (EMERGENCY)
Dept: HOSPITAL 15 - ER | Age: 75
Discharge: LEFT BEFORE DISCHARGE | End: 2025-03-15
Payer: MEDICARE

## 2025-03-15 VITALS
SYSTOLIC BLOOD PRESSURE: 134 MMHG | DIASTOLIC BLOOD PRESSURE: 84 MMHG | OXYGEN SATURATION: 100 % | RESPIRATION RATE: 17 BRPM | TEMPERATURE: 98 F

## 2025-03-15 VITALS — HEART RATE: 91 BPM

## 2025-03-15 VITALS — BODY MASS INDEX: 42.67 KG/M2 | WEIGHT: 211.64 LBS | HEIGHT: 59 IN

## 2025-03-15 DIAGNOSIS — Z90.49: ICD-10-CM
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
## 2025-03-15 DIAGNOSIS — S22.31XA: Primary | ICD-10-CM

## 2025-03-15 DIAGNOSIS — Z90.710: ICD-10-CM
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
## 2025-03-15 DIAGNOSIS — Z79.899: ICD-10-CM

## 2025-03-15 DIAGNOSIS — I10: ICD-10-CM

## 2025-03-15 DIAGNOSIS — E16.2: ICD-10-CM
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
## 2025-03-15 DIAGNOSIS — F12.90: ICD-10-CM

## 2025-03-15 LAB
ALBUMIN SERPL-MCNC: 4.5 G/DL (ref 3.2–4.8)
ALP SERPL-CCNC: 124 U/L (ref 46–116)
ALT SERPL-CCNC: 17 U/L (ref 7–40)
ANION GAP SERPL CALCULATED.3IONS-SCNC: 7 MMOL/L (ref 5–15)
BILIRUB SERPL-MCNC: 0.4 MG/DL (ref 0.2–1)
BUN SERPL-MCNC: 15 MG/DL (ref 9–23)
BUN/CREAT SERPL: 16 (ref 10–20)
CALCIUM SERPL-MCNC: 9.1 MG/DL (ref 8.7–10.4)
CHLORIDE SERPL-SCNC: 113 MMOL/L (ref 98–107)
CK SERPL-CCNC: 66 U/L (ref 34–145)
CO2 SERPL-SCNC: 17 MMOL/L (ref 20–31)
GLUCOSE SERPL-MCNC: 66 MG/DL (ref 74–106)
HCT VFR BLD AUTO: 37.8 % (ref 36–46)
HGB BLD-MCNC: 13.2 G/DL (ref 12.2–16.2)
MCH RBC QN AUTO: 35.4 PG (ref 28–32)
MCV RBC AUTO: 101.7 FL (ref 80–100)
NRBC BLD QL AUTO: 0.1 %
POTASSIUM SERPL-SCNC: 4.5 MMOL/L (ref 3.5–5.1)
PROT SERPL-MCNC: 6.9 G/DL (ref 5.7–8.2)
PROT UR STRIP.AUTO-MCNC: (no result) MG/DL
SODIUM SERPL-SCNC: 137 MMOL/L (ref 136–145)

## 2025-03-15 PROCEDURE — 82550 ASSAY OF CK (CPK): CPT

## 2025-03-15 PROCEDURE — 81001 URINALYSIS AUTO W/SCOPE: CPT

## 2025-03-15 PROCEDURE — 84484 ASSAY OF TROPONIN QUANT: CPT

## 2025-03-15 PROCEDURE — 80053 COMPREHEN METABOLIC PANEL: CPT

## 2025-03-15 PROCEDURE — 36415 COLL VENOUS BLD VENIPUNCTURE: CPT

## 2025-03-15 PROCEDURE — 93005 ELECTROCARDIOGRAM TRACING: CPT

## 2025-03-15 PROCEDURE — 85025 COMPLETE CBC W/AUTO DIFF WBC: CPT

## 2025-03-15 PROCEDURE — 71250 CT THORAX DX C-: CPT

## 2025-03-15 NOTE — ED.PDOC
Back pain HPI


HPI Comments


HPI:


Poor Historian.


74-year-old female presents to emergency department for evaluation of 

generalized ribcage pain bilaterally and on the sides of her ribcage and in her 

upper back.  Onset of symptoms this past Friday after a friend of the family 

gave her a strong hugger.  She felt her ribs cracked at that time and she has 

been having increased pain since then.  Her pain is worse with deep inspiration.





Past Medical History:  Hypertension,


Past Surgical History:  Bladder sling, cholecystectomy, hysterectomy








REVIEW OF SYSTEMS:





CONSTITUTIONAL:





Denies acute: fever, diaphoresis, chills,  generalized weakness.





HEAD:


Denies acute:  headache, photophobia





Eyes:


Denies acute: Double vision, vision loss, eye pain, eye discharge.





EARS: 


Denies acute: tinnitus, hearing loss, ear discharge, ear pain,





THROAT: 


Denies acute: sore throat, swelling, difficulty swallowing , pain with 

swallowing, change in  voice.





NECK:


Denies acute: neck pain, neck swelling, stiff neck.





HEART:


Denies acute :  palpitations,


 


LUNGS:


Denies acute: SOB, wheezing, cough, hemoptysis





ABDOMEN:


Denies acute: abdominal pain, Nausea, Vomiting, diarrhea, melena , hematemesis, 

hematochezia





SKIN:


Denies acute: rash, redness, lesions, itchiness. 





EXTREMITIES:


Denies acute: calf pain, numbness, tingling, weakness, denies pain in extremity.


Denies acute: Low back pain. 





Neuro:


Denies acute: focal neurological deficit, motor or sensory focal neurological 

deficit, tremors, seizure like activity, confusion, dizziness, change in mental 

status, loss of bowel or bladder function, cauda equina like symptoms.  





: 


Denies acute: dysuria, hematuria, flank pain, increase in urinary frequency.











PSYCH: 


Denies acute: hallucination, suicidal ideation, homicidal ideation.





FEMALE: 


Denies acute:  abnormal vaginal bleeding, foul odor, unusual discharge.  








PHYSICAL EXAM:


General:  Mild-to-moderate acute distress, awake and alert.





Head: normocephalic, atraumatic.





Neck: 


supple, trachea is midline, no swelling. 








Throat:  Normal phonation.








Eyes:, no erythema, no purulent discharge, no proptosis, no icterus.


   


 


Heart:  regular rate, regular rhythm, no significant murmur appreciated.





Lungs: no apparent respiratory distress, 


   Able to speak in full sentences. 


No wheezing, no rhonchi, no crackles.  No stridors


   Clear to auscultation bilaterally.





Abdomen: non tender to palpation, non distended, soft, no guarding, no rebound, 

+ bowel sounds.











Neuro: Awake, Alert, oriented to name, self, situation, follows commands


 GCS=15.  


Speech is normal.





   


Skin: no petechia, no purpura, no cyanosis, non-pale, not jaundice. 





Lower extremities:  --no - Pitting edema


no deformity, no focal swelling, no calf TTP. 





Makes eye contact.





moves all four extremities.  


   





Face: no apparent facial droop.





Generalized tenderness to palpation throughout the ribcage areas bilaterally, no

crepitus, no swelling, 


 





Ambulating in the ED independently.





 














_________________________________________


ED COURSE:


Chief Complaint:  Chest Pain


Time Seen by MD:  15:19


Primary Care Provider:  ENDO


Reviewed Notes:  Nurses Notes, Medications, Allergies


Allergies:  


Coded Allergies:  


     NO KNOWN ALLERGIES (Unverified , 22)


Home Meds


Active Scripts


Naproxen (NAPROSYN TABLET) 500 Mg Tb, 1 TAB PO BID PRN, #30 TAB 0 Refills


   Prov:DARIA MIGUEL         24


Nitrofurantoin (Nitrofurantoin) 100 Mg Cap, 1 CAP PO BID for 5 Days, #10 CAP


   Prov:JAH PALMA RESIDENT         24


Cyanocobalamin (Gnp Vitamin B12) 500 Mcg Tab, 500 MCG PO DAILY for 30 Days, #30 

TAB 2 Refills


   Prov:JAH PALMA RESIDENT         24


Reported Medications


Irbesartan (IRBESARTAN) 300 Mg Tab, 300 TAB PO DAILY


   22


Amlodipine Besylate (Amlodipine Besylate) 5 Mg Tab, 5 TAB PO DAILY


   22


Information Source:  Patient


Mode of Arrival:  Ambulatory





Past Medical History


PAST MEDICAL HISTORY:  HTN


Surgical History:  Cholecystectomy, Hysterectomy


GYN History:  No Pertinent GYN History





Family History


Family History:  Unknown





Social History


Smoker:  Non-Smoker


Alcohol:  Occasionally


Drugs:  Marijuana


Lives In:  Home





Was a procedure done?


Was a procedure done?:  No





Back Pain Differential Dx


Differential Diagnosis:  Other (Ddx include but not limitied to gastritis, 

musculoskeletal pain, radiculopathy, atypical chest pain, dissection, aneurysm, 

ACS, unstable angina, hiatal hernia, GERD, anxiety, costochondritis, PE, 

pneumothroax, neoplasm, cardiac ischemia, drug abuse, anemia.)





X-Ray, Labs, Meds, VS





                                   Vital Signs








  Date Time  Temp Pulse Resp B/P (MAP) Pulse Ox O2 Delivery O2 Flow Rate FiO2


 


3/15/25 16:42  91      


 


3/15/25 15:23 98.0 106 17 134/84 (101) 100   





 98.0       


 


3/15/25 15:14  101      








                                       Lab








Test


 3/15/25


16:47 3/15/25


15:39 Range/Units


 


 


Troponin I High Sensitivity 5  5  </=34  ng/L


 


White Blood Count


 


 7.3 


 4.4-10.8


10^3/uL


 


Red Blood Count


 


 3.71 L


 4.0-5.20


10^6/uL


 


Hemoglobin  13.2  12.2-16.2  g/dL


 


Hematocrit  37.8  36.0-46.0  %


 


Mean Corpuscular Volume  101.7 H 80.0-100.0  fL


 


Mean Corpuscular Hemoglobin  35.4 H 28.0-32.0  pg


 


Mean Corpuscular Hemoglobin


Concent 


 34.8 


 32.0-36.0  g/dL





 


Red Cell Distribution Width  13.4  11.8-14.3  %


 


Platelet Count


 


 357 


 140-450


10^3/uL


 


Mean Platelet Volume  6.8 L 6.9-10.8  fL


 


Neutrophils (%) (Auto)  57.0  37.0-80.0  %


 


Lymphocytes (%) (Auto)  30.0  10.0-50.0  %


 


Monocytes (%) (Auto)  9.9  0.0-12.0  %


 


Eosinophils (%) (Auto)  2.2  0.0-7.0  %


 


Basophils (%) (Auto)  0.9  0.0-2.0  %


 


Neutrophils # (Auto)


 


 4.2 


 1.6-8.6  10


^3/uL


 


Lymphocytes # (Auto)


 


 2.2 


 0.4-5.4  10


^3/uL


 


Monocytes # (Auto)  0.7  0-1.3  10 ^3/uL


 


Eosinophils # (Auto)  0.2  0-0.8  10 ^3/uL


 


Basophils # (Auto)  0.1  0-0.2  10 ^3/uL


 


Nucleated Red Blood Cells  0.1   %


 


Sodium Level  137  136-145  mmol/L


 


Potassium Level  4.5  3.5-5.1  mmol/L


 


Chloride Level  113 H   mmol/L


 


Carbon Dioxide Level  17 L 20-31  mmol/L


 


Anion Gap  7  5-15  


 


Blood Urea Nitrogen  15  9-23  mg/dL


 


Creatinine


 


 0.94 


 0.550-1.02


mg/dL


 


Glomerular Filtration Rate


Calc 


 64 


 >90  mL/min





 


BUN/Creatinine Ratio  16.0  10.0-20.0  


 


Serum Glucose  66 L   mg/dL


 


Calcium Level  9.1  8.7-10.4  mg/dL


 


Total Bilirubin  0.4  0.2-1.0  mg/dL


 


Aspartate Amino Transferase


(AST) 


 18 


 13-40  U/L





 


Alanine Aminotransferase (ALT)  17  7-40  U/L


 


Alkaline Phosphatase  124 H   U/L


 


Creatine Kinase  66    U/L


 


Total Protein  6.9  5.7-8.2  g/dL


 


Albumin  4.5  3.2-4.8  g/dL





[place] [id number]


                              Ph: [phone]





                               DIAGNOSTIC IMAGING


                      Diagnostic Imaging Report : [id number]


                                     Signed








PATIENT: SEBASTIÁN HIDALGO ACCT: [id number]        UNIT: [id number]


: 1950           LOC: ER                   ROOM / BED:  / 


AGE / SEX: 74 / F         ADM STATUS: REG ER        SERVICE DT: 03/15/25 1531





ORDERING PHYSICIAN: ZOEY MCKINNEY DO


PROCEDURE(s): CX2CT - CHEST WITHOUT CONTRAST


REASON: rib pain b/l


ORDER NUMBER(s): [id number], ACCESSION NUMBER(s): [id number]








EXAM: CT CHEST WITHOUT CONTRAST





History: rib pain b/l





Comparison Study: None available





TECHNIQUE: Multidetector CT of the chest was performed. Imaging was performed 

without IV contrast. Axial, coronal, and sagittal multiplanar reformats were 

obtained from the axial data set by the technologist.





Radiation Dose : 





CTDI vol 5.6 mGy, .58 mGy*cm.





Findings:





Lungs: The lungs are clear.





Pleura: Unremarkable





Heart/Great vessels: No cardiomegaly or pericardial effusion. 





Mediastinum: Unremarkable





Soft tissues/Bones: Mild multilevel degenerative changes of the thoracic spine. 

Nondisplaced fractures of the lateral aspects of right ribs 4-6.





Cholecystectomy.  Calcifications in the pancreatic head.  Left renal cysts.  

Diverticulosis. The partially visualized upper abdomen is within normal limits.





Impression: 





1. No acute cardiopulmonary disease.





2. Nondisplaced fractures of the lateral aspects of right ribs 4-6.





Electronically Signed by: Misa Carnes at 03/15/2025 17:55:50 PM











DICTATED BY: MISA CARNES DO


DICTATED DATE/TIME: 03/15/25 1755





SIGNED BY: MISA CARNES DO


SIGNED DATE/TIME: 03/15/25 1755





CC: 





Time of 1ST Reevaluation:  18:20


Reevaluation 1ST:  Unchanged


Patient Education/Counseling:  Other


Family Education/Counseling:  Other


Comments


Patient presented with the above HPI.--ribcage pain----workup was initiated. 

patient was found with the above mentioned diagnosis. 





the following medications were ordered:  


 please refer to order lists of meds and tests obtained by myself Dr. Mckinney. 





Patient ED course and VS have been stabilized. 





 Patient eloped.  Earlier we attempted to give the patient juice to drink given 

her hypoglycemia.  She was seen walking out not wanted to stay anymore in the ED





 





All the reports of any imaging studies that were ordered by myself were reviewed

by myself.





Departure 1


Departure


Time of Disposition:  18:17


Impression:  


   Primary Impression:  


   Rib fracture


   Additional Impressions:  


   Eloped from emergency department


   Hypoglycemia


Disposition:  07 LEFT AWOL/ELOPED


Condition:  Guarded





Additional Instructions:  


Patient eloped


Discharged With:  Self





Critical Care Note


Critical Care Time?:  No





Heart Score


Heart Score:  








Heart Score Response (Comments) Value


 


History Slightly Suspicious 0


 


EKG Normal 0


 


Age >65 2


 


Risk Factors                            1 or 2 risk factors 1


 


Troponin Normal limit 0


 


Total  3

















ZOEY MCKINNEY DO                Mar 15, 2025 15:45

## 2025-03-15 NOTE — [facility]
EXAM: CT CHEST WITHOUT CONTRAST



History: rib pain b/l



Comparison Study: None available



TECHNIQUE: Multidetector CT of the chest was performed. Imaging was performed without IV contrast. Ax
ial, coronal, and sagittal multiplanar reformats were obtained from the axial data set by the technol
panda.



Radiation Dose : 



CTDI vol 5.6 mGy, .58 mGy*cm.



Findings:



Lungs: The lungs are clear.



Pleura: Unremarkable



Heart/Great vessels: No cardiomegaly or pericardial effusion. 



Mediastinum: Unremarkable



Soft tissues/Bones: Mild multilevel degenerative changes of the thoracic spine. Nondisplaced fracture
s of the lateral aspects of right ribs 4-6.



Cholecystectomy.  Calcifications in the pancreatic head.  Left renal cysts.  Diverticulosis. The part
ially visualized upper abdomen is within normal limits.



Impression: 



1. No acute cardiopulmonary disease.



2. Nondisplaced fractures of the lateral aspects of right ribs 4-6.



Electronically Signed by: Misa Carnes at 03/15/2025 17:55:50 PM

## 2025-03-16 NOTE — ECG
[facility]

                                       

Test Date:    2025-03-15               Test Time:    16:42:32

Pat Name:     SEBASTIÁN HIDALGO         Department:   ER

Patient ID:   [id number]           Room:          

Gender:       F                        Technician:   LA

:          1950               Requested By: EMERGENCY EMERGENCY

Order Number: [id number]        Reading MD:   Wyatt Magdaleno

                                 Measurements

Intervals                              Axis          

Rate:         91                       P:            35

CA:           144                      QRS:          5

QRSD:         72                       T:            55

QT:           361                                    

QTc:          445                                    

                           Interpretive Statements

Sinus rhythm

Low voltage, precordial leads

Probable anteroseptal infarct, old

Electronically Signed On 3- 20:44:04 PDT by Wyatt Magdaleno



Please click the below link to view image of tracing.

## 2025-03-16 NOTE — ECG
[facility]

                                       

Test Date:    2025-03-15               Test Time:    15:14:50

Pat Name:     SEBASTIÁN HIDALGO         Department:   ER

Patient ID:   [id number]           Room:          

Gender:       F                        Technician:   MARINE

:          1950               Requested By: EMERGENCY EMERGENCY

Order Number: [id number]        Reading MD:   Wyatt Magdaleno

                                 Measurements

Intervals                              Axis          

Rate:         101                      P:            15

WA:           135                      QRS:          -15

QRSD:         82                       T:            20

QT:           328                                    

QTc:          426                                    

                           Interpretive Statements

Sinus tachycardia

Inferior infarct, old

Anterior infarct, old

Electronically Signed On 3- 19:20:16 PDT by Wyatt Magdaleno



Please click the below link to view image of tracing.